# Patient Record
Sex: MALE | Race: WHITE | NOT HISPANIC OR LATINO
[De-identification: names, ages, dates, MRNs, and addresses within clinical notes are randomized per-mention and may not be internally consistent; named-entity substitution may affect disease eponyms.]

---

## 2019-11-27 PROBLEM — Z00.00 ENCOUNTER FOR PREVENTIVE HEALTH EXAMINATION: Status: ACTIVE | Noted: 2019-11-27

## 2021-07-11 ENCOUNTER — RESULT CHARGE (OUTPATIENT)
Age: 56
End: 2021-07-11

## 2021-07-12 ENCOUNTER — LABORATORY RESULT (OUTPATIENT)
Age: 56
End: 2021-07-12

## 2021-07-12 ENCOUNTER — NON-APPOINTMENT (OUTPATIENT)
Age: 56
End: 2021-07-12

## 2021-07-12 ENCOUNTER — APPOINTMENT (OUTPATIENT)
Dept: INTERNAL MEDICINE | Facility: CLINIC | Age: 56
End: 2021-07-12
Payer: COMMERCIAL

## 2021-07-12 ENCOUNTER — TRANSCRIPTION ENCOUNTER (OUTPATIENT)
Age: 56
End: 2021-07-12

## 2021-07-12 VITALS
BODY MASS INDEX: 25.48 KG/M2 | OXYGEN SATURATION: 98 % | TEMPERATURE: 97.3 F | DIASTOLIC BLOOD PRESSURE: 60 MMHG | RESPIRATION RATE: 16 BRPM | HEART RATE: 65 BPM | SYSTOLIC BLOOD PRESSURE: 120 MMHG | HEIGHT: 71 IN | WEIGHT: 182 LBS

## 2021-07-12 DIAGNOSIS — Z13.21 ENCOUNTER FOR SCREENING FOR NUTRITIONAL DISORDER: ICD-10-CM

## 2021-07-12 DIAGNOSIS — Z80.8 FAMILY HISTORY OF MALIGNANT NEOPLASM OF OTHER ORGANS OR SYSTEMS: ICD-10-CM

## 2021-07-12 DIAGNOSIS — Z63.5 DISRUPTION OF FAMILY BY SEPARATION AND DIVORCE: ICD-10-CM

## 2021-07-12 DIAGNOSIS — Z85.828 PERSONAL HISTORY OF OTHER MALIGNANT NEOPLASM OF SKIN: ICD-10-CM

## 2021-07-12 DIAGNOSIS — Z13.220 ENCOUNTER FOR SCREENING FOR LIPOID DISORDERS: ICD-10-CM

## 2021-07-12 DIAGNOSIS — J93.83 OTHER PNEUMOTHORAX: ICD-10-CM

## 2021-07-12 DIAGNOSIS — Z11.59 ENCOUNTER FOR SCREENING FOR OTHER VIRAL DISEASES: ICD-10-CM

## 2021-07-12 DIAGNOSIS — Z11.4 ENCOUNTER FOR SCREENING FOR HUMAN IMMUNODEFICIENCY VIRUS [HIV]: ICD-10-CM

## 2021-07-12 DIAGNOSIS — Z11.3 ENCOUNTER FOR SCREENING FOR INFECTIONS WITH A PREDOMINANTLY SEXUAL MODE OF TRANSMISSION: ICD-10-CM

## 2021-07-12 DIAGNOSIS — F12.90 CANNABIS USE, UNSPECIFIED, UNCOMPLICATED: ICD-10-CM

## 2021-07-12 DIAGNOSIS — Z13.1 ENCOUNTER FOR SCREENING FOR DIABETES MELLITUS: ICD-10-CM

## 2021-07-12 PROCEDURE — 99214 OFFICE O/P EST MOD 30 MIN: CPT | Mod: 25

## 2021-07-12 PROCEDURE — 99072 ADDL SUPL MATRL&STAF TM PHE: CPT

## 2021-07-12 PROCEDURE — 36415 COLL VENOUS BLD VENIPUNCTURE: CPT

## 2021-07-12 PROCEDURE — G0442 ANNUAL ALCOHOL SCREEN 15 MIN: CPT

## 2021-07-12 PROCEDURE — 93000 ELECTROCARDIOGRAM COMPLETE: CPT | Mod: 59

## 2021-07-12 PROCEDURE — 99386 PREV VISIT NEW AGE 40-64: CPT | Mod: 25

## 2021-07-12 PROCEDURE — G0444 DEPRESSION SCREEN ANNUAL: CPT

## 2021-07-12 RX ORDER — TAMSULOSIN HYDROCHLORIDE 0.4 MG/1
0.4 CAPSULE ORAL
Qty: 90 | Refills: 0 | Status: DISCONTINUED | COMMUNITY
Start: 2021-07-12 | End: 2021-07-12

## 2021-07-12 SDOH — SOCIAL STABILITY - SOCIAL INSECURITY: DISRUPTION OF FAMILY BY SEPARATION AND DIVORCE: Z63.5

## 2021-07-12 NOTE — ASSESSMENT
[FreeTextEntry1] : Plan as above.\par \par The patient was advised to do some research into the Tdap and Shingrix vaccines, which he will do before deciding on whether or not to proceed with these vaccines.\par \par The patient was advised to call for any problems or questions.\par \par Return visit for follow-up in 1 month.

## 2021-07-12 NOTE — PHYSICAL EXAM
[No Acute Distress] : no acute distress [Well Nourished] : well nourished [Well Developed] : well developed [Well-Appearing] : well-appearing [Normal Sclera/Conjunctiva] : normal sclera/conjunctiva [EOMI] : extraocular movements intact [Normal Outer Ear/Nose] : the outer ears and nose were normal in appearance [No JVD] : no jugular venous distention [No Lymphadenopathy] : no lymphadenopathy [Supple] : supple [Thyroid Normal, No Nodules] : the thyroid was normal and there were no nodules present [No Respiratory Distress] : no respiratory distress  [No Accessory Muscle Use] : no accessory muscle use [Clear to Auscultation] : lungs were clear to auscultation bilaterally [Normal Percussion] : the chest was normal to percussion [Normal Rate] : normal rate  [Regular Rhythm] : with a regular rhythm [Normal S1, S2] : normal S1 and S2 [No Murmur] : no murmur heard [No Carotid Bruits] : no carotid bruits [No Abdominal Bruit] : a ~M bruit was not heard ~T in the abdomen [No Varicosities] : no varicosities [Pedal Pulses Present] : the pedal pulses are present [No Edema] : there was no peripheral edema [No Palpable Aorta] : no palpable aorta [No Extremity Clubbing/Cyanosis] : no extremity clubbing/cyanosis [Soft] : abdomen soft [Non Tender] : non-tender [Non-distended] : non-distended [No Masses] : no abdominal mass palpated [No HSM] : no HSM [Normal Bowel Sounds] : normal bowel sounds [No Hernias] : no hernias [Normal Sphincter Tone] : normal sphincter tone [No Mass] : no mass [Normal Supraclavicular Nodes] : no supraclavicular lymphadenopathy [Normal Posterior Cervical Nodes] : no posterior cervical lymphadenopathy [Normal Anterior Cervical Nodes] : no anterior cervical lymphadenopathy [Normal Inguinal Nodes] : no inguinal lymphadenopathy [Normal Femoral Nodes] : no femoral lymphadenopathy [No CVA Tenderness] : no CVA  tenderness [No Spinal Tenderness] : no spinal tenderness [No Joint Swelling] : no joint swelling [Grossly Normal Strength/Tone] : grossly normal strength/tone [No Rash] : no rash [Coordination Grossly Intact] : coordination grossly intact [No Focal Deficits] : no focal deficits [Normal Gait] : normal gait [Speech Grossly Normal] : speech grossly normal [Memory Grossly Normal] : memory grossly normal [Normal Affect] : the affect was normal [Alert and Oriented x3] : oriented to person, place, and time [Normal Mood] : the mood was normal [Normal Insight/Judgement] : insight and judgment were intact [FreeTextEntry1] : External hemorrhoids.  Smooth BPH without nodules. [de-identified] : Normal testes and genitalia.  No hernias.

## 2021-07-12 NOTE — HISTORY OF PRESENT ILLNESS
[FreeTextEntry1] : 55-year-old male here to establish care, for annual comprehensive evaluation and with several medical issues. [de-identified] : The patient generally considers himself to be in very good health, but he has had several complaints that have been developing over the past few months.\par His primary complaint concerns his urination, which he believes is because of his prostate.  He reports that his urinary stream is now broken into 2 parts, and that the force may be somewhat less than previously.  He urinates more frequently and finds that it sometimes takes longer to urinate than it did previously.  Otherwise, he has had no urinary symptoms such as dysuria or hematuria.  He also reports that he has less frequent and shorter lasting erections since he has noticed the symptoms mentioned above.\par The patient also suffers from chronic hemorrhoids and occasionally sees bright red blood per rectum, especially when he has hard stools and has trouble with his hemorrhoids.  He has had 2 colonoscopies in the past, the most recent one approximately 2 years ago, at which time he was told to return in 10 years.\par The patient drinks a lot of sugary cola drinks and is concerned about the possibility of diabetes.  He is trying to cut down on this significantly.\par The patient also has a history of several skin cancers and a family history of melanoma (his mother).  He sees a dermatologist quite regularly and is careful about avoiding sun exposure.

## 2021-07-12 NOTE — HEALTH RISK ASSESSMENT
[20 or more] : 20 or more [No] : In the past 12 months have you used drugs other than those required for medical reasons? No [No falls in past year] : Patient reported no falls in the past year [0] : 2) Feeling down, depressed, or hopeless: Not at all (0) [PHQ-2 Negative - No further assessment needed] : PHQ-2 Negative - No further assessment needed [Patient reported colonoscopy was normal] : Patient reported colonoscopy was normal [HIV Test offered] : HIV Test offered [Hepatitis C test offered] : Hepatitis C test offered [Fully functional (bathing, dressing, toileting, transferring, walking, feeding)] : Fully functional (bathing, dressing, toileting, transferring, walking, feeding) [Fully functional (using the telephone, shopping, preparing meals, housekeeping, doing laundry, using] : Fully functional and needs no help or supervision to perform IADLs (using the telephone, shopping, preparing meals, housekeeping, doing laundry, using transportation, managing medications and managing finances) [] : No [YearQuit] : 2003 [Audit-CScore] : 0 [ERS0Zirpi] : 0 [ColonoscopyDate] : 06/19

## 2021-07-13 ENCOUNTER — TRANSCRIPTION ENCOUNTER (OUTPATIENT)
Age: 56
End: 2021-07-13

## 2021-07-13 DIAGNOSIS — E55.9 VITAMIN D DEFICIENCY, UNSPECIFIED: ICD-10-CM

## 2021-07-13 DIAGNOSIS — R71.8 OTHER ABNORMALITY OF RED BLOOD CELLS: ICD-10-CM

## 2021-07-13 LAB
25(OH)D3 SERPL-MCNC: 21.8 NG/ML
ALBUMIN SERPL ELPH-MCNC: 4.5 G/DL
ALP BLD-CCNC: 78 U/L
ALT SERPL-CCNC: 18 U/L
ANION GAP SERPL CALC-SCNC: 12 MMOL/L
AST SERPL-CCNC: 19 U/L
BASOPHILS # BLD AUTO: 0.1 K/UL
BASOPHILS NFR BLD AUTO: 1.5 %
BILIRUB SERPL-MCNC: 0.4 MG/DL
BUN SERPL-MCNC: 12 MG/DL
C TRACH RRNA SPEC QL NAA+PROBE: NOT DETECTED
CALCIUM SERPL-MCNC: 9.7 MG/DL
CHLORIDE SERPL-SCNC: 106 MMOL/L
CHOLEST SERPL-MCNC: 167 MG/DL
CO2 SERPL-SCNC: 24 MMOL/L
CREAT SERPL-MCNC: 0.93 MG/DL
EOSINOPHIL # BLD AUTO: 0.09 K/UL
EOSINOPHIL NFR BLD AUTO: 1.3 %
ESTIMATED AVERAGE GLUCOSE: 103 MG/DL
GLUCOSE SERPL-MCNC: 93 MG/DL
HBA1C MFR BLD HPLC: 5.2 %
HCT VFR BLD CALC: 34.2 %
HCV AB SER QL: NONREACTIVE
HCV S/CO RATIO: 0.12 S/CO
HDLC SERPL-MCNC: 46 MG/DL
HGB BLD-MCNC: 9.2 G/DL
HIV1+2 AB SPEC QL IA.RAPID: NONREACTIVE
IMM GRANULOCYTES NFR BLD AUTO: 0.3 %
IRON SATN MFR SERPL: 3 %
IRON SERPL-MCNC: 12 UG/DL
LDLC SERPL CALC-MCNC: 109 MG/DL
LYMPHOCYTES # BLD AUTO: 2.91 K/UL
LYMPHOCYTES NFR BLD AUTO: 43 %
MAN DIFF?: NORMAL
MCHC RBC-ENTMCNC: 17 PG
MCHC RBC-ENTMCNC: 26.9 GM/DL
MCV RBC AUTO: 63.2 FL
MONOCYTES # BLD AUTO: 0.4 K/UL
MONOCYTES NFR BLD AUTO: 5.9 %
N GONORRHOEA RRNA SPEC QL NAA+PROBE: NOT DETECTED
NEUTROPHILS # BLD AUTO: 3.24 K/UL
NEUTROPHILS NFR BLD AUTO: 48 %
NONHDLC SERPL-MCNC: 122 MG/DL
PLATELET # BLD AUTO: 316 K/UL
POTASSIUM SERPL-SCNC: 5 MMOL/L
PROT SERPL-MCNC: 7.2 G/DL
RBC # BLD: 5.41 M/UL
RBC # FLD: 21.9 %
SODIUM SERPL-SCNC: 142 MMOL/L
SOURCE AMPLIFICATION: NORMAL
TIBC SERPL-MCNC: 467 UG/DL
TRIGL SERPL-MCNC: 66 MG/DL
TSH SERPL-ACNC: 1.06 UIU/ML
UIBC SERPL-MCNC: 455 UG/DL
WBC # FLD AUTO: 6.76 K/UL

## 2021-07-13 RX ORDER — FERROUS SULFATE 137(45) MG
142 (45 FE) TABLET, EXTENDED RELEASE ORAL
Qty: 1 | Refills: 0 | Status: ACTIVE | COMMUNITY
Start: 2021-07-13

## 2021-07-13 RX ORDER — ADHESIVE TAPE 3"X 2.3 YD
50 MCG TAPE, NON-MEDICATED TOPICAL
Qty: 1 | Refills: 0 | Status: ACTIVE | COMMUNITY
Start: 2021-07-13

## 2021-07-14 LAB
FERRITIN SERPL-MCNC: 4 NG/ML
T PALLIDUM AB SER QL IA: NEGATIVE

## 2021-07-15 ENCOUNTER — TRANSCRIPTION ENCOUNTER (OUTPATIENT)
Age: 56
End: 2021-07-15

## 2021-07-16 ENCOUNTER — NON-APPOINTMENT (OUTPATIENT)
Age: 56
End: 2021-07-16

## 2021-07-16 LAB
HGB A MFR BLD: 98.2 %
HGB A2 MFR BLD: 1.8 %
HGB FRACT BLD-IMP: NORMAL

## 2021-07-22 ENCOUNTER — APPOINTMENT (OUTPATIENT)
Dept: GASTROENTEROLOGY | Facility: CLINIC | Age: 56
End: 2021-07-22
Payer: COMMERCIAL

## 2021-07-22 ENCOUNTER — NON-APPOINTMENT (OUTPATIENT)
Age: 56
End: 2021-07-22

## 2021-07-22 VITALS
BODY MASS INDEX: 25.48 KG/M2 | WEIGHT: 182 LBS | HEART RATE: 66 BPM | HEIGHT: 71 IN | DIASTOLIC BLOOD PRESSURE: 74 MMHG | SYSTOLIC BLOOD PRESSURE: 120 MMHG | OXYGEN SATURATION: 99 % | TEMPERATURE: 97.4 F

## 2021-07-22 PROCEDURE — 99072 ADDL SUPL MATRL&STAF TM PHE: CPT

## 2021-07-22 PROCEDURE — 99244 OFF/OP CNSLTJ NEW/EST MOD 40: CPT

## 2021-07-22 NOTE — PHYSICAL EXAM
[General Appearance - Alert] : alert [General Appearance - In No Acute Distress] : in no acute distress [Sclera] : the sclera and conjunctiva were normal [Neck Appearance] : the appearance of the neck was normal [Outer Ear] : the ears and nose were normal in appearance [] : no respiratory distress [Abdomen Soft] : soft [Abnormal Walk] : normal gait [Skin Color & Pigmentation] : normal skin color and pigmentation [Cranial Nerves] : cranial nerves 2-12 were intact [Oriented To Time, Place, And Person] : oriented to person, place, and time

## 2021-07-22 NOTE — HISTORY OF PRESENT ILLNESS
[FreeTextEntry1] : Mr. Singh is a pleasant 55M h/o basal cell skin cancer s/p excision, pneumothorax 1987, BPH who is referred by Dr. Martinez for iron deficiency anemia.\par \par He has a long h/o internal hemorrhoids that bleed, states this has been ongoing off and on for many years.  Has recently seen itnermittent BRBPR which is characteristic of his hemorrhoidal bleeding.\par \agustin Has had two colonoscopies (first 12 years ago, had 4 polyps removed, second 2 years ago, no polyps, only hemorrhoids).\par \par During routine blood work recently with Dr. Martinez, he was found to be iron deficient and anemic. He has since started iron supplementation.  No black stool. No abd pain. No NSAIDs or aspirin.  No abd pain, N/V, weight change.  Otherwise feels well.\par \par Does not drink or smoke cigarettes.\par \par No FHx of any GI malignancies.

## 2021-07-22 NOTE — ASSESSMENT
[FreeTextEntry1] : Will plan on an upper endoscopy and colonoscopy for iron deficiency anemia.  Explained risks/benefits/alternatives including not limited to bleeding, infection, perforation, missed lesions, anesthesia complications.  Patient understands and agrees, all questions answered.  Will use a split dose miralax/gatorade prep with clears the day prior.\par \par Will refer to Dr. Blue for evaluation of hemorrhoids, treatment as needed.  Suspect given his long h/o recurrent hemorhroidal bleeding that he will need surgical treatment.\par \par Thank you for referring Mr. WATSON.  Please do not hesitate to call to further discuss his/her care.\par \par Note faxed to requesting MD.\par \par

## 2021-08-09 ENCOUNTER — LABORATORY RESULT (OUTPATIENT)
Age: 56
End: 2021-08-09

## 2021-08-09 ENCOUNTER — APPOINTMENT (OUTPATIENT)
Dept: INTERNAL MEDICINE | Facility: CLINIC | Age: 56
End: 2021-08-09
Payer: COMMERCIAL

## 2021-08-09 VITALS
HEIGHT: 71 IN | TEMPERATURE: 97 F | WEIGHT: 182 LBS | SYSTOLIC BLOOD PRESSURE: 102 MMHG | OXYGEN SATURATION: 99 % | BODY MASS INDEX: 25.48 KG/M2 | DIASTOLIC BLOOD PRESSURE: 64 MMHG | RESPIRATION RATE: 16 BRPM | HEART RATE: 64 BPM

## 2021-08-09 DIAGNOSIS — N52.9 MALE ERECTILE DYSFUNCTION, UNSPECIFIED: ICD-10-CM

## 2021-08-09 DIAGNOSIS — E78.5 HYPERLIPIDEMIA, UNSPECIFIED: ICD-10-CM

## 2021-08-09 PROCEDURE — 99214 OFFICE O/P EST MOD 30 MIN: CPT | Mod: 25

## 2021-08-09 PROCEDURE — 36415 COLL VENOUS BLD VENIPUNCTURE: CPT

## 2021-08-09 NOTE — PHYSICAL EXAM
[Normal Sclera/Conjunctiva] : normal sclera/conjunctiva [Normal Outer Ear/Nose] : the outer ears and nose were normal in appearance [Normal Percussion] : the chest was normal to percussion [No Edema] : there was no peripheral edema [No Rash] : no rash [Normal Gait] : normal gait [Normal] : affect was normal and insight and judgment were intact

## 2021-08-09 NOTE — HISTORY OF PRESENT ILLNESS
[FreeTextEntry1] : 55-year-old male here for follow-up of several medical issues. [de-identified] : The patient has been taking his iron supplement and reports that his fatigue is somewhat improved.  He was evaluated by the gastroenterologist and has been scheduled for an upper endoscopy and colonoscopy on September 1.  The gastroenterologist also referred the patient to a colorectal surgeon for more definitive treatment of his hemorrhoids, which is probably the cause of his rectal bleeding.\par The patient also has been taking his alfuzosin and reports that his stream has somewhat improved in terms of strength, although it is still a split stream.  He also still has nocturia, although he reports he is drinking plenty of fluids and it is hard to determine if that is the cause.  He still suffers from erectile dysfunction, although that too seems somewhat improved since starting the alfuzosin.  It is not, however, quite where he would like it to be.

## 2021-08-10 LAB
BASOPHILS # BLD AUTO: 0.13 K/UL
BASOPHILS NFR BLD AUTO: 2 %
EOSINOPHIL # BLD AUTO: 0.2 K/UL
EOSINOPHIL NFR BLD AUTO: 3.1 %
FERRITIN SERPL-MCNC: 22 NG/ML
HCT VFR BLD CALC: 41.1 %
HGB BLD-MCNC: 11.3 G/DL
IMM GRANULOCYTES NFR BLD AUTO: 0.2 %
IRON SATN MFR SERPL: 6 %
IRON SERPL-MCNC: 23 UG/DL
LYMPHOCYTES # BLD AUTO: 2.76 K/UL
LYMPHOCYTES NFR BLD AUTO: 43 %
MAN DIFF?: NORMAL
MCHC RBC-ENTMCNC: 19.6 PG
MCHC RBC-ENTMCNC: 27.5 GM/DL
MCV RBC AUTO: 71.4 FL
MONOCYTES # BLD AUTO: 0.48 K/UL
MONOCYTES NFR BLD AUTO: 7.5 %
NEUTROPHILS # BLD AUTO: 2.84 K/UL
NEUTROPHILS NFR BLD AUTO: 44.2 %
PLATELET # BLD AUTO: 288 K/UL
RBC # BLD: 5.76 M/UL
RBC # FLD: 29.2 %
TIBC SERPL-MCNC: 415 UG/DL
UIBC SERPL-MCNC: 391 UG/DL
WBC # FLD AUTO: 6.42 K/UL

## 2021-08-30 ENCOUNTER — RESULT REVIEW (OUTPATIENT)
Age: 56
End: 2021-08-30

## 2021-08-31 ENCOUNTER — RESULT REVIEW (OUTPATIENT)
Age: 56
End: 2021-08-31

## 2021-09-01 ENCOUNTER — APPOINTMENT (OUTPATIENT)
Dept: GASTROENTEROLOGY | Facility: HOSPITAL | Age: 56
End: 2021-09-01

## 2021-09-16 ENCOUNTER — APPOINTMENT (OUTPATIENT)
Dept: SURGERY | Facility: CLINIC | Age: 56
End: 2021-09-16
Payer: COMMERCIAL

## 2021-09-16 VITALS
DIASTOLIC BLOOD PRESSURE: 56 MMHG | WEIGHT: 174 LBS | BODY MASS INDEX: 24.36 KG/M2 | SYSTOLIC BLOOD PRESSURE: 123 MMHG | RESPIRATION RATE: 18 BRPM | HEIGHT: 71 IN | HEART RATE: 52 BPM | OXYGEN SATURATION: 100 %

## 2021-09-16 PROCEDURE — 99203 OFFICE O/P NEW LOW 30 MIN: CPT | Mod: 25

## 2021-09-16 PROCEDURE — 46600 DIAGNOSTIC ANOSCOPY SPX: CPT

## 2021-09-17 NOTE — HISTORY OF PRESENT ILLNESS
[FreeTextEntry1] : 55 M here for initial evaluation for bleeding per rectum. Referred by Dr Martinez and Dr Bates. Had a colonoscopy recently which revealed large internal hemorrhoids. No other clear source for bleeding. Is now iron deficient and slightly anemic. Taking iron supplements. \par \par Has bleeding with most bowel movements. Feels lumps external to anal opening but also feels tissue that falls out and has to be pushed back in. No prior procedures for hemorrhoids in the past. \par \par Colonoscopy also revealed two hyperplastic polyps.

## 2021-09-17 NOTE — PROCEDURE
[FreeTextEntry1] : Anoscopy: Significantly enlarged internal hemorrhoids with prolapse, mildly erythematous, no active bleeding, otherwise normal mucosa.

## 2021-09-17 NOTE — PHYSICAL EXAM
[Abdomen Masses] : No abdominal masses [Abdomen Tenderness] : ~T No ~M abdominal tenderness [Excoriation] : no perianal excoriation [Fistula] : no fistulas [Manually Reducible] : a manually reducible (grade III) [Normal] : was normal [None] : there was no rectal mass  [JVD] : no jugular venous distention  [Respiratory Effort] : normal respiratory effort [Normal Rate and Rhythm] : normal rate and rhythm [No Rash or Lesion] : No rash or lesion [Alert] : alert [Calm] : calm [de-identified] : External hemorrhoids/skin tags [de-identified] : No acute distress

## 2021-09-17 NOTE — ASSESSMENT
[FreeTextEntry1] : 55 M with prolapsing bleeding internal hemorrhoids and external hemorrhoids with some discomfort. \par \par Discussed management of hemorrhoids. Could consider banding of internal hemorrhoids but significant change that will not improve bleeding symptoms given they are large enough to prolapse on a regular basis. \par \par Would recommend excisional hemorrhoidectomy to resolve both internal and external hemorrhoids. \par \par Discussed recovery with initial pain lasting 7-10 days. Will take approximately 4-6 weeks to heal completely. \par \par Risks and benefits discussed including bleeding, open wounds, small risk of infection, anal stenosis if too much tissue removed. \par \par Will plan for hemorrhoidectomy, likely end of November early December as he wants to finish some travel he has planned before surgery.

## 2021-09-29 ENCOUNTER — APPOINTMENT (OUTPATIENT)
Dept: INTERNAL MEDICINE | Facility: CLINIC | Age: 56
End: 2021-09-29
Payer: COMMERCIAL

## 2021-09-29 ENCOUNTER — LABORATORY RESULT (OUTPATIENT)
Age: 56
End: 2021-09-29

## 2021-09-29 VITALS
SYSTOLIC BLOOD PRESSURE: 110 MMHG | BODY MASS INDEX: 23.94 KG/M2 | HEART RATE: 78 BPM | OXYGEN SATURATION: 98 % | DIASTOLIC BLOOD PRESSURE: 78 MMHG | RESPIRATION RATE: 16 BRPM | TEMPERATURE: 98 F | WEIGHT: 171 LBS | HEIGHT: 71 IN

## 2021-09-29 DIAGNOSIS — N40.1 BENIGN PROSTATIC HYPERPLASIA WITH LOWER URINARY TRACT SYMPMS: ICD-10-CM

## 2021-09-29 DIAGNOSIS — Z87.891 PERSONAL HISTORY OF NICOTINE DEPENDENCE: ICD-10-CM

## 2021-09-29 PROCEDURE — 99214 OFFICE O/P EST MOD 30 MIN: CPT | Mod: 25

## 2021-09-29 PROCEDURE — 36415 COLL VENOUS BLD VENIPUNCTURE: CPT

## 2021-09-29 RX ORDER — SILDENAFIL 50 MG/1
50 TABLET ORAL
Qty: 10 | Refills: 3 | Status: ACTIVE | COMMUNITY
Start: 2021-08-09 | End: 1900-01-01

## 2021-09-29 RX ORDER — ZOSTER VACCINE RECOMBINANT, ADJUVANTED 50 MCG/0.5
50 KIT INTRAMUSCULAR
Qty: 1 | Refills: 1 | Status: ACTIVE | COMMUNITY
Start: 2021-09-29 | End: 1900-01-01

## 2021-09-29 NOTE — ASSESSMENT
[FreeTextEntry1] : Plan as above.\par \par The patient will do further research into the flu and Tdap vaccines and consider taking these vaccines at some future appointment.\par \par The patient was advised to call for any questions or problems.\par \par Return visit for preoperative evaluation in the next few months.

## 2021-09-29 NOTE — HISTORY OF PRESENT ILLNESS
[FreeTextEntry1] : 55-year-old male here for follow-up of several medical issues. [de-identified] : The patient reports that he has had continued rectal bleeding of a significant nature.  He did undergo colonoscopy and endoscopy, neither of which showed any significant source of bleeding other than his hemorrhoids.  He saw a colorectal surgeon and was advised to undergo hemorrhoidectomy because of the persistent bleeding.  He is hesitant to have surgery, but understands the need for this and will proceed later this year.  The patient has been taking his iron every day.\par The patient has been taking the alfuzosin and reports that his urination has been better with less frequency and less nocturia.  He would like to continue taking this medication because of its benefits.\par Patient also reports that the sildenafil is helping with his erectile dysfunction and he would like to continue using this medication as well.\par The patient wants to proceed with the shingles vaccine, but is hesitant to take the flu or Tdap vaccines and is deferring those at this time.

## 2021-09-29 NOTE — PHYSICAL EXAM
[Normal Sclera/Conjunctiva] : normal sclera/conjunctiva [Normal Outer Ear/Nose] : the outer ears and nose were normal in appearance [Normal Percussion] : the chest was normal to percussion [No Edema] : there was no peripheral edema [Normal Supraclavicular Nodes] : no supraclavicular lymphadenopathy [No Rash] : no rash [Normal Gait] : normal gait [Normal] : affect was normal and insight and judgment were intact

## 2021-09-30 LAB
BASOPHILS # BLD AUTO: 0.11 K/UL
BASOPHILS NFR BLD AUTO: 1.5 %
EOSINOPHIL # BLD AUTO: 0.12 K/UL
EOSINOPHIL NFR BLD AUTO: 1.7 %
FERRITIN SERPL-MCNC: 15 NG/ML
HCT VFR BLD CALC: 44.5 %
HGB BLD-MCNC: 13.3 G/DL
IMM GRANULOCYTES NFR BLD AUTO: 0.3 %
IRON SATN MFR SERPL: 5 %
IRON SERPL-MCNC: 20 UG/DL
LYMPHOCYTES # BLD AUTO: 2.26 K/UL
LYMPHOCYTES NFR BLD AUTO: 31.2 %
MAN DIFF?: NORMAL
MCHC RBC-ENTMCNC: 24.3 PG
MCHC RBC-ENTMCNC: 29.9 GM/DL
MCV RBC AUTO: 81.2 FL
MONOCYTES # BLD AUTO: 0.51 K/UL
MONOCYTES NFR BLD AUTO: 7 %
NEUTROPHILS # BLD AUTO: 4.22 K/UL
NEUTROPHILS NFR BLD AUTO: 58.3 %
PLATELET # BLD AUTO: 252 K/UL
RBC # BLD: 5.48 M/UL
RBC # FLD: 23.3 %
TIBC SERPL-MCNC: 378 UG/DL
UIBC SERPL-MCNC: 358 UG/DL
WBC # FLD AUTO: 7.24 K/UL

## 2021-10-03 ENCOUNTER — RX RENEWAL (OUTPATIENT)
Age: 56
End: 2021-10-03

## 2021-10-03 RX ORDER — ALFUZOSIN HYDROCHLORIDE 10 MG/1
10 TABLET, EXTENDED RELEASE ORAL
Qty: 90 | Refills: 2 | Status: ACTIVE | COMMUNITY
Start: 2021-07-12 | End: 1900-01-01

## 2021-11-16 ENCOUNTER — APPOINTMENT (OUTPATIENT)
Dept: SURGERY | Facility: CLINIC | Age: 56
End: 2021-11-16
Payer: COMMERCIAL

## 2021-11-16 VITALS
WEIGHT: 170 LBS | RESPIRATION RATE: 18 BRPM | BODY MASS INDEX: 23.8 KG/M2 | DIASTOLIC BLOOD PRESSURE: 71 MMHG | SYSTOLIC BLOOD PRESSURE: 129 MMHG | HEART RATE: 67 BPM | OXYGEN SATURATION: 100 % | HEIGHT: 71 IN

## 2021-11-16 PROCEDURE — 99212 OFFICE O/P EST SF 10 MIN: CPT

## 2021-11-22 ENCOUNTER — NON-APPOINTMENT (OUTPATIENT)
Age: 56
End: 2021-11-22

## 2021-11-22 ENCOUNTER — APPOINTMENT (OUTPATIENT)
Dept: INTERNAL MEDICINE | Facility: CLINIC | Age: 56
End: 2021-11-22
Payer: COMMERCIAL

## 2021-11-22 VITALS
RESPIRATION RATE: 16 BRPM | SYSTOLIC BLOOD PRESSURE: 124 MMHG | WEIGHT: 176 LBS | BODY MASS INDEX: 24.64 KG/M2 | HEART RATE: 65 BPM | DIASTOLIC BLOOD PRESSURE: 80 MMHG | HEIGHT: 71 IN | TEMPERATURE: 97.3 F | OXYGEN SATURATION: 99 %

## 2021-11-22 DIAGNOSIS — Z23 ENCOUNTER FOR IMMUNIZATION: ICD-10-CM

## 2021-11-22 DIAGNOSIS — Z01.818 ENCOUNTER FOR OTHER PREPROCEDURAL EXAMINATION: ICD-10-CM

## 2021-11-22 DIAGNOSIS — D50.0 IRON DEFICIENCY ANEMIA SECONDARY TO BLOOD LOSS (CHRONIC): ICD-10-CM

## 2021-11-22 DIAGNOSIS — K62.5 HEMORRHAGE OF ANUS AND RECTUM: ICD-10-CM

## 2021-11-22 PROCEDURE — 99214 OFFICE O/P EST MOD 30 MIN: CPT | Mod: 25

## 2021-11-22 PROCEDURE — 36415 COLL VENOUS BLD VENIPUNCTURE: CPT

## 2021-11-22 PROCEDURE — 93000 ELECTROCARDIOGRAM COMPLETE: CPT

## 2021-11-22 NOTE — PHYSICAL EXAM
[Normal Sclera/Conjunctiva] : normal sclera/conjunctiva [Normal Outer Ear/Nose] : the outer ears and nose were normal in appearance [Normal Percussion] : the chest was normal to percussion [Pedal Pulses Present] : the pedal pulses are present [No Edema] : there was no peripheral edema [Normal Supraclavicular Nodes] : no supraclavicular lymphadenopathy [No Rash] : no rash [Normal Gait] : normal gait [Normal] : affect was normal and insight and judgment were intact

## 2021-11-23 LAB
ALBUMIN SERPL ELPH-MCNC: 4.2 G/DL
ALP BLD-CCNC: 80 U/L
ALT SERPL-CCNC: 18 U/L
ANION GAP SERPL CALC-SCNC: 13 MMOL/L
APTT BLD: 32.7 SEC
AST SERPL-CCNC: 15 U/L
BASOPHILS # BLD AUTO: 0.14 K/UL
BASOPHILS NFR BLD AUTO: 2 %
BILIRUB SERPL-MCNC: <0.2 MG/DL
BUN SERPL-MCNC: 12 MG/DL
CALCIUM SERPL-MCNC: 9.2 MG/DL
CHLORIDE SERPL-SCNC: 105 MMOL/L
CO2 SERPL-SCNC: 23 MMOL/L
CREAT SERPL-MCNC: 0.9 MG/DL
EOSINOPHIL # BLD AUTO: 0.17 K/UL
EOSINOPHIL NFR BLD AUTO: 2.5 %
GLUCOSE SERPL-MCNC: 104 MG/DL
HCT VFR BLD CALC: 39.7 %
HGB BLD-MCNC: 12.3 G/DL
IMM GRANULOCYTES NFR BLD AUTO: 0.1 %
INR PPP: 0.96 RATIO
IRON SATN MFR SERPL: 72 %
IRON SERPL-MCNC: 285 UG/DL
LYMPHOCYTES # BLD AUTO: 2.7 K/UL
LYMPHOCYTES NFR BLD AUTO: 39 %
MAN DIFF?: NORMAL
MCHC RBC-ENTMCNC: 27 PG
MCHC RBC-ENTMCNC: 31 GM/DL
MCV RBC AUTO: 87.3 FL
MONOCYTES # BLD AUTO: 0.56 K/UL
MONOCYTES NFR BLD AUTO: 8.1 %
NEUTROPHILS # BLD AUTO: 3.34 K/UL
NEUTROPHILS NFR BLD AUTO: 48.3 %
PLATELET # BLD AUTO: 276 K/UL
POTASSIUM SERPL-SCNC: 4.5 MMOL/L
PROT SERPL-MCNC: 6.5 G/DL
PT BLD: 11.3 SEC
RBC # BLD: 4.55 M/UL
RBC # FLD: 14.9 %
SODIUM SERPL-SCNC: 141 MMOL/L
TIBC SERPL-MCNC: 398 UG/DL
UIBC SERPL-MCNC: 113 UG/DL
WBC # FLD AUTO: 6.92 K/UL

## 2021-11-23 NOTE — HISTORY OF PRESENT ILLNESS
[No Pertinent Cardiac History] : no history of aortic stenosis, atrial fibrillation, coronary artery disease, recent myocardial infarction, or implantable device/pacemaker [No Pertinent Pulmonary History] : no history of asthma, COPD, sleep apnea, or smoking [No Adverse Anesthesia Reaction] : no adverse anesthesia reaction in self or family member [(Patient denies any chest pain, claudication, dyspnea on exertion, orthopnea, palpitations or syncope)] : Patient denies any chest pain, claudication, dyspnea on exertion, orthopnea, palpitations or syncope [Chronic Anticoagulation] : no chronic anticoagulation [Chronic Kidney Disease] : no chronic kidney disease [Diabetes] : no diabetes [FreeTextEntry1] : hemorrhoidectomy [FreeTextEntry2] : 11/29/2021 [FreeTextEntry3] : Mahad Blue MD [FreeTextEntry4] : 56-year-old male scheduled for hemorrhoidectomy 1 week from today.  The patient has continued to have persistent rectal bleeding.  His colonoscopy revealed no pathology other than the hemorrhoids, which clearly must be excised to remediate this ongoing issue.  The patient is taking an iron supplement and his most recent hematocrit was normal, but the iron remains low.  He has not had any cardiovascular symptoms, even with exertion or exercise.

## 2021-11-23 NOTE — ASSESSMENT
[Patient Optimized for Surgery] : Patient optimized for surgery [No Further Testing Recommended] : no further testing recommended [Continue medications as is] : Continue current medications [As per surgery] : as per surgery [FreeTextEntry4] : Plan as above.\par \par The patient was advised to call for any problems or questions.\par \par Return visit for follow-up in 1 month.\par \par This patient appears to be clinically optimized for the proposed surgery, but I will review the blood test results before finalizing this report.\par ADDENDUM (11/23/2021): I have reviewed the blood test results and this patient is clinically optimized for the proposed surgery.

## 2021-11-23 NOTE — RESULTS/DATA
[] : results reviewed [de-identified] : within normal limits.  [de-identified] : within normal limits.  [de-identified] : within normal limits.  [de-identified] : EKG: normal sinus rhythm 63; within normal limits.

## 2021-11-26 ENCOUNTER — RESULT REVIEW (OUTPATIENT)
Age: 56
End: 2021-11-26

## 2021-11-28 ENCOUNTER — RESULT REVIEW (OUTPATIENT)
Age: 56
End: 2021-11-28

## 2021-11-29 ENCOUNTER — APPOINTMENT (OUTPATIENT)
Dept: SURGERY | Facility: HOSPITAL | Age: 56
End: 2021-11-29

## 2021-12-15 NOTE — PHYSICAL EXAM
[Abdomen Masses] : No abdominal masses [Abdomen Tenderness] : ~T No ~M abdominal tenderness [JVD] : no jugular venous distention  [Respiratory Effort] : normal respiratory effort [Normal Rate and Rhythm] : normal rate and rhythm [No Rash or Lesion] : No rash or lesion [Alert] : alert [Calm] : calm [de-identified] : No acute distress

## 2021-12-15 NOTE — ASSESSMENT
[FreeTextEntry1] : Mr. WATSON is a 56 year male who presents for symptomatic hemorrhoids. \par \par Discussed management options for hemorrhoids including medical management with fiber supplements, increased fluid intake, and symptom management during acute flares, office based procedures including rubber band ligation, and surgical excision. \par \par Given the findings today on exam recommend surgical management with excisional hemorrhoidectomy.\par Discussed details of surgery including post-operative care and recovery. Should expect 10-14 days of pain that will improve each day. Can take 4-6 weeks to heal completely from the incisions. Small amounts of bleeding are normal and should resolve relatively soon after surgery. Sutures may tear out in the post-operative period but wounds will still heal with time. \par \par Risks and benefits discussed including post-operative pain, delayed wound healing, infection. Risk of anal stenosis discussed and potential that I will not be able to remove all of the hemorrhoidal tissue in one surgery.\par \par \par Will schedule patient for excisional hemorrhoidectomy. \par Will get Covid testing prior to surgery. Phone number provided to schedule testing appointment. Post-operative recovery and care instructions provided. \par \par

## 2021-12-22 ENCOUNTER — APPOINTMENT (OUTPATIENT)
Dept: SURGERY | Facility: CLINIC | Age: 56
End: 2021-12-22
Payer: COMMERCIAL

## 2021-12-22 VITALS
HEIGHT: 71 IN | DIASTOLIC BLOOD PRESSURE: 65 MMHG | SYSTOLIC BLOOD PRESSURE: 128 MMHG | OXYGEN SATURATION: 99 % | WEIGHT: 168 LBS | HEART RATE: 70 BPM | BODY MASS INDEX: 23.52 KG/M2

## 2021-12-22 DIAGNOSIS — K64.9 UNSPECIFIED HEMORRHOIDS: ICD-10-CM

## 2021-12-22 PROCEDURE — 99024 POSTOP FOLLOW-UP VISIT: CPT

## 2022-01-24 PROBLEM — K64.9 HEMORRHOIDS: Status: ACTIVE | Noted: 2021-07-12

## 2022-01-24 NOTE — HISTORY OF PRESENT ILLNESS
[FreeTextEntry1] : 56-year-old male here for postop visit after excisional hemorrhoidectomy.  Had significant pain but this is improved.  Now doing quite well since surgery.  Does not feel like the wounds have completely healed but pain is improved.\par \par Denies fever nausea vomiting.  Moving bowels regularly.  Eating without difficulty.

## 2022-01-24 NOTE — ASSESSMENT
[FreeTextEntry1] : 56-year-old male here for postop visit after excisional hemorrhoidectomy.\par \par Patient recovering well.  Continue local care with warm baths as needed.  We will continue to heal over the following weeks.  Continue high-fiber diet for regular bowel movements.  Can return to normal activities.\par \par Follow-up as needed.

## 2022-01-24 NOTE — PHYSICAL EXAM
[Abdomen Masses] : No abdominal masses [Abdomen Tenderness] : ~T No ~M abdominal tenderness [JVD] : no jugular venous distention  [Respiratory Effort] : normal respiratory effort [No Rash or Lesion] : No rash or lesion [Alert] : not alert [Calm] : calm [de-identified] : Healing hemorrhoidectomy incisions.  No erythema no purulence [de-identified] : No acute distress

## 2023-03-16 ENCOUNTER — APPOINTMENT (OUTPATIENT)
Dept: INTERNAL MEDICINE | Facility: CLINIC | Age: 58
End: 2023-03-16